# Patient Record
(demographics unavailable — no encounter records)

---

## 2025-06-23 NOTE — PHYSICAL EXAM
[Chaperone Declined] : Chaperone offered however refused by patient, [Appropriately responsive] : appropriately responsive [Alert] : alert [No Acute Distress] : no acute distress [No Lymphadenopathy] : no lymphadenopathy [Soft] : soft [Non-tender] : non-tender [Non-distended] : non-distended [No HSM] : No HSM [No Lesions] : no lesions [No Mass] : no mass [Oriented x3] : oriented x3

## 2025-06-23 NOTE — PROCEDURE
[Amenorrhea] : Amenorrhea [Transvaginal Ultrasound] : transvaginal ultrasound [FreeTextEntry4] : SIUP at 8.3 weeks fetal heart rate positive,  free fluid DENISE 1/30/2023

## 2025-06-23 NOTE — DISCUSSION/SUMMARY
[FreeTextEntry1] : 29-year-old para 0 LMP 4/25 at 8.3 weeks for pregnancy confirmation, DENISE 1/30/2026 Sono S IUP at 8.3 weeks fetal heart rate positive Pregnancy counseling Prenatal vitamin NT to schedule Prenatal labs to do in 2 weeks Possible Pap next visit Follow-up 1 months

## 2025-06-23 NOTE — HISTORY OF PRESENT ILLNESS
[Patient reported PAP Smear was normal] : Patient reported PAP Smear was normal [Y] : Patient is sexually active [N] : Patient denies prior pregnancies [FreeTextEntry1] : 28 YO P-0 LMP-  4- is here for initial visit , amenorrhea , urine pregnancy test- positive . pregnancy is not unexpected but welcome Patient is here with her partner.  She is here from [place]. No nausea, is a   Partner is an RN in [employer] [TextBox_4] : No fibroids cysts or STDs Last Pap 2021 History of Gardasil 14/28/7 [PapSmeardate] : 2021 [LMPDate] : 4-

## 2025-07-24 NOTE — OB SUMMARY
[Date: _____] : Date: [unfilled] [Gestational Age: _____] : Gestational Age: [unfilled] [FreeTextEntry2] : SIUP at 12+ week Doing well Results discussed [facility] 274 today NT to schedule RTO 1 month [FreeTextEntry1] : Pt is here for her 12 week OB check-up, pt has no complaints today.  wt- 135 ht- 5 '6 b/p- 124/73 pulse- 80 temp- 98.6 pro- neg glu- neg  [de-identified] : sm

## 2025-07-24 NOTE — OB SUMMARY
[Date: _____] : Date: [unfilled] [Gestational Age: _____] : Gestational Age: [unfilled] [FreeTextEntry2] : SIUP at 12+ week Doing well Results discussed [facility] 274 today NT to schedule RTO 1 month [FreeTextEntry1] : Pt is here for her 12 week OB check-up, pt has no complaints today.  wt- 135 ht- 5 '6 b/p- 124/73 pulse- 80 temp- 98.6 pro- neg glu- neg  [de-identified] : sm